# Patient Record
(demographics unavailable — no encounter records)

---

## 2024-10-15 NOTE — HISTORY OF PRESENT ILLNESS
[Postpartum Follow Up] : postpartum follow up [Last Pap Date: ___] : Last Pap Date: [unfilled] [Delivery Date: ___] : on [unfilled] [] : delivered by vaginal delivery [Female] : Delivery History: baby girl [Wt. ___] : weighing [unfilled] [Breastfeeding] : currently nursing [BF with Difficulty] : nursing with difficulty [Discharge HCT: ___] : hematocrit level was [unfilled] [Discharge HGB: ___] : hemoglobin level was [unfilled] [Resumed Menses] : has resumed her menses [Pertussis Vaccine] : Pertussis vaccine administered [Back to Normal] : is back to normal in size [Examination Of The Breasts] : breasts are normal [Doing Well] : is doing well [No Sign of Infection] : is showing no signs of infection [Excellent Pain Control] : has excellent pain control [None] : None [Rhogam] : Rhogam was not administered [Rubella Vaccine] : Rubella vaccine was not administered [BTL] : no tubal ligation [Resumed [place]] : has not resumed intercourse [Intended Contraception] : the patient does not intended to use contraception postpartum [FreeTextEntry8] : Breast/Bottlefeeding / Contraception counseling / Pospartum anemia [de-identified] : (+)Suprapubic abscess mildly tender - advised to use Cleocin-T  [de-identified] : Postpartum exam / Anemia / Contraception / Hidradenitis S. / Vitamin D Deficiency [de-identified] : (+)HA - Rx Cleocin-T; RTO 6 months for annual gyn exam / Vitamin D Deficiency - continue daily / Breastfeeding - Takin PNV daily

## 2024-10-15 NOTE — HISTORY OF PRESENT ILLNESS
[Postpartum Follow Up] : postpartum follow up [Last Pap Date: ___] : Last Pap Date: [unfilled] [Delivery Date: ___] : on [unfilled] [] : delivered by vaginal delivery [Female] : Delivery History: baby girl [Wt. ___] : weighing [unfilled] [Breastfeeding] : currently nursing [BF with Difficulty] : nursing with difficulty [Discharge HCT: ___] : hematocrit level was [unfilled] [Discharge HGB: ___] : hemoglobin level was [unfilled] [Resumed Menses] : has resumed her menses [Pertussis Vaccine] : Pertussis vaccine administered [Back to Normal] : is back to normal in size [Examination Of The Breasts] : breasts are normal [Doing Well] : is doing well [No Sign of Infection] : is showing no signs of infection [Excellent Pain Control] : has excellent pain control [None] : None [Rhogam] : Rhogam was not administered [Rubella Vaccine] : Rubella vaccine was not administered [BTL] : no tubal ligation [Resumed [place]] : has not resumed intercourse [Intended Contraception] : the patient does not intended to use contraception postpartum [FreeTextEntry8] : Breast/Bottlefeeding / Contraception counseling / Pospartum anemia [de-identified] : (+)Suprapubic abscess mildly tender - advised to use Cleocin-T  [de-identified] : Postpartum exam / Anemia / Contraception / Hidradenitis S. / Vitamin D Deficiency [de-identified] : (+)HA - Rx Cleocin-T; RTO 6 months for annual gyn exam / Vitamin D Deficiency - continue daily / Breastfeeding - Takin PNV daily

## 2025-05-02 NOTE — PHYSICAL EXAM
[Chaperoned Physical Exam] : A chaperone was present in the examining room during all aspects of the physical examination. [MA] : MA [FreeTextEntry1] : [place] [Appropriately responsive] : appropriately responsive [Alert] : alert [No Acute Distress] : no acute distress [No Lymphadenopathy] : no lymphadenopathy [Regular Rate Rhythm] : regular rate rhythm [No Murmurs] : no murmurs [Clear to Auscultation B/L] : clear to auscultation bilaterally [Soft] : soft [Non-tender] : non-tender [Non-distended] : non-distended [No HSM] : No HSM [No Lesions] : no lesions [No Mass] : no mass [Oriented x3] : oriented x3 [Examination Of The Breasts] : a normal appearance [No Masses] : no breast masses were palpable [Labia Majora] : normal [Labia Minora] : normal [Normal] : normal [Uterine Adnexae] : normal

## 2025-05-02 NOTE — HISTORY OF PRESENT ILLNESS
[FreeTextEntry1] : Annual Gyn exam  Complaining of contact dermatitis after starting to use a special detergent to clean her baby's bottles, requesting treatment.   LMP(04/15/25) Menarche~14 / q 28 days / 5 days - starting light to normal quantity and duration.  Last Pap- never   / s/p  x 1 ([id number]) - Denies history of bladder dysfunction.  Dating / Sexually active w/partner, using withdrawal method for contraception, inquiring about alternate mode of contraception.  Used OCP pre-pregnancy, wants to restart again, currently no longer breast-feeding.   Living w/parents, not working currently.

## 2025-05-02 NOTE — PLAN
[FreeTextEntry1] : Annual gyn exam - Pap/HPV/STD sent; Subacute Vaginitis - Aptima sent - treat based on results, pt aware; History of Irregular cycles - Hormone panel / Health maintenance panel ordered today / Referral for Pelvic U/S given; Contraception counseling provided - Rx Apri - 3 packs/ 3 refills sent to pharmacy; (+)Hidradenitis S. - Rx Clindamycin to effected renewed / Skin hygiene reviewed / RTO 6 months for f/u of treatment response to HS w/OCP use; Contact Dermatitis -advised to use gloves / Rx Betamethasone to effected area